# Patient Record
Sex: FEMALE | Race: WHITE | NOT HISPANIC OR LATINO
[De-identification: names, ages, dates, MRNs, and addresses within clinical notes are randomized per-mention and may not be internally consistent; named-entity substitution may affect disease eponyms.]

---

## 2022-08-31 PROBLEM — Z00.00 ENCOUNTER FOR PREVENTIVE HEALTH EXAMINATION: Status: ACTIVE | Noted: 2022-08-31

## 2022-09-22 ENCOUNTER — APPOINTMENT (OUTPATIENT)
Dept: OTOLARYNGOLOGY | Facility: CLINIC | Age: 57
End: 2022-09-22

## 2022-09-22 VITALS
WEIGHT: 137 LBS | BODY MASS INDEX: 25.21 KG/M2 | SYSTOLIC BLOOD PRESSURE: 105 MMHG | HEART RATE: 67 BPM | TEMPERATURE: 97.5 F | DIASTOLIC BLOOD PRESSURE: 68 MMHG | HEIGHT: 62 IN

## 2022-09-22 DIAGNOSIS — H81.10 BENIGN PAROXYSMAL VERTIGO, UNSPECIFIED EAR: ICD-10-CM

## 2022-09-22 DIAGNOSIS — F32.A DEPRESSION, UNSPECIFIED: ICD-10-CM

## 2022-09-22 DIAGNOSIS — R42 DIZZINESS AND GIDDINESS: ICD-10-CM

## 2022-09-22 DIAGNOSIS — E78.5 HYPERLIPIDEMIA, UNSPECIFIED: ICD-10-CM

## 2022-09-22 DIAGNOSIS — H90.3 SENSORINEURAL HEARING LOSS, BILATERAL: ICD-10-CM

## 2022-09-22 DIAGNOSIS — E03.9 HYPOTHYROIDISM, UNSPECIFIED: ICD-10-CM

## 2022-09-22 DIAGNOSIS — Z78.9 OTHER SPECIFIED HEALTH STATUS: ICD-10-CM

## 2022-09-22 PROCEDURE — 99204 OFFICE O/P NEW MOD 45 MIN: CPT | Mod: 25

## 2022-09-22 PROCEDURE — 92567 TYMPANOMETRY: CPT

## 2022-09-22 PROCEDURE — 92557 COMPREHENSIVE HEARING TEST: CPT

## 2022-09-22 PROCEDURE — G0268 REMOVAL OF IMPACTED WAX MD: CPT

## 2022-09-22 RX ORDER — SERTRALINE HYDROCHLORIDE 200 MG/1
200 CAPSULE ORAL
Refills: 0 | Status: ACTIVE | COMMUNITY

## 2022-09-22 RX ORDER — LEVOTHYROXINE SODIUM 150 UG/1
150 TABLET ORAL
Refills: 0 | Status: ACTIVE | COMMUNITY

## 2022-09-22 RX ORDER — BUPROPION HYDROCHLORIDE 150 MG/1
150 TABLET, FILM COATED ORAL
Refills: 0 | Status: ACTIVE | COMMUNITY

## 2022-09-22 NOTE — ASSESSMENT
[FreeTextEntry1] : Patient with c/o clogged ears and also hx of balance problems which started several weeks ago.  Balance better now.  Feel likely BPPV and in view of improvement recommended conservative care - given vestibular exercises to do as necessary.  Also has symmetric snhl - discussed HAE - patient not interested - recommended yearly audio

## 2022-09-22 NOTE — DATA REVIEWED
[de-identified] : Mild to moderate SNHL bilaterally\par Type A tymps\par REC HAE-pt not interested\par

## 2022-09-22 NOTE — HISTORY OF PRESENT ILLNESS
[de-identified] : c/o feeling of clogged ears.  Problem started 3 weeks ago - Also noted problems with balance when standing.  ? decreased hearing in right ear.  Noted when getting up with balance - did have problems turning.  Problem much better now.  No prior ear problems   Occ ear infections as child

## 2023-08-31 ENCOUNTER — EMERGENCY (EMERGENCY)
Facility: HOSPITAL | Age: 58
LOS: 0 days | Discharge: HOME | End: 2023-09-01
Attending: FAMILY MEDICINE
Payer: COMMERCIAL

## 2023-08-31 VITALS
SYSTOLIC BLOOD PRESSURE: 134 MMHG | HEIGHT: 62 IN | WEIGHT: 134.92 LBS | HEART RATE: 66 BPM | RESPIRATION RATE: 17 BRPM | DIASTOLIC BLOOD PRESSURE: 75 MMHG | TEMPERATURE: 98 F | OXYGEN SATURATION: 97 %

## 2023-08-31 DIAGNOSIS — S32.018A OTHER FRACTURE OF FIRST LUMBAR VERTEBRA, INITIAL ENCOUNTER FOR CLOSED FRACTURE: ICD-10-CM

## 2023-08-31 DIAGNOSIS — M25.522 PAIN IN LEFT ELBOW: ICD-10-CM

## 2023-08-31 DIAGNOSIS — M54.50 LOW BACK PAIN, UNSPECIFIED: ICD-10-CM
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
## 2023-08-31 LAB
ALBUMIN SERPL ELPH-MCNC: 3.9 G/DL — SIGNIFICANT CHANGE UP (ref 3.3–5)
ALP SERPL-CCNC: 87 U/L — SIGNIFICANT CHANGE UP (ref 40–120)
ALT FLD-CCNC: 31 U/L — SIGNIFICANT CHANGE UP (ref 12–78)
ANION GAP SERPL CALC-SCNC: 2 MMOL/L — LOW (ref 5–17)
APPEARANCE UR: CLEAR — SIGNIFICANT CHANGE UP
APTT BLD: 30.8 SEC — SIGNIFICANT CHANGE UP (ref 24.5–35.6)
AST SERPL-CCNC: 18 U/L — SIGNIFICANT CHANGE UP (ref 15–37)
BACTERIA # UR AUTO: ABNORMAL
BASOPHILS # BLD AUTO: 0.09 K/UL — SIGNIFICANT CHANGE UP (ref 0–0.2)
BASOPHILS NFR BLD AUTO: 1.4 % — SIGNIFICANT CHANGE UP (ref 0–2)
BILIRUB SERPL-MCNC: 0.2 MG/DL — SIGNIFICANT CHANGE UP (ref 0.2–1.2)
BILIRUB UR-MCNC: NEGATIVE — SIGNIFICANT CHANGE UP
BLD GP AB SCN SERPL QL: SIGNIFICANT CHANGE UP
BUN SERPL-MCNC: 15 MG/DL — SIGNIFICANT CHANGE UP (ref 7–23)
CALCIUM SERPL-MCNC: 9.4 MG/DL — SIGNIFICANT CHANGE UP (ref 8.5–10.1)
CHLORIDE SERPL-SCNC: 109 MMOL/L — HIGH (ref 96–108)
CO2 SERPL-SCNC: 29 MMOL/L — SIGNIFICANT CHANGE UP (ref 22–31)
COLOR SPEC: YELLOW — SIGNIFICANT CHANGE UP
CREAT SERPL-MCNC: 0.77 MG/DL — SIGNIFICANT CHANGE UP (ref 0.5–1.3)
DIFF PNL FLD: ABNORMAL
EGFR: 89 ML/MIN/1.73M2 — SIGNIFICANT CHANGE UP
EOSINOPHIL # BLD AUTO: 0.12 K/UL — SIGNIFICANT CHANGE UP (ref 0–0.5)
EOSINOPHIL NFR BLD AUTO: 1.8 % — SIGNIFICANT CHANGE UP (ref 0–6)
EPI CELLS # UR: SIGNIFICANT CHANGE UP
GLUCOSE SERPL-MCNC: 101 MG/DL — HIGH (ref 70–99)
GLUCOSE UR QL: NEGATIVE — SIGNIFICANT CHANGE UP
HCT VFR BLD CALC: 37.4 % — SIGNIFICANT CHANGE UP (ref 34.5–45)
HGB BLD-MCNC: 12.7 G/DL — SIGNIFICANT CHANGE UP (ref 11.5–15.5)
IMM GRANULOCYTES NFR BLD AUTO: 0.3 % — SIGNIFICANT CHANGE UP (ref 0–0.9)
INR BLD: 0.91 RATIO — SIGNIFICANT CHANGE UP (ref 0.85–1.18)
KETONES UR-MCNC: NEGATIVE — SIGNIFICANT CHANGE UP
LEUKOCYTE ESTERASE UR-ACNC: ABNORMAL
LYMPHOCYTES # BLD AUTO: 2.14 K/UL — SIGNIFICANT CHANGE UP (ref 1–3.3)
LYMPHOCYTES # BLD AUTO: 32.3 % — SIGNIFICANT CHANGE UP (ref 13–44)
MCHC RBC-ENTMCNC: 30.8 PG — SIGNIFICANT CHANGE UP (ref 27–34)
MCHC RBC-ENTMCNC: 34 GM/DL — SIGNIFICANT CHANGE UP (ref 32–36)
MCV RBC AUTO: 90.8 FL — SIGNIFICANT CHANGE UP (ref 80–100)
MONOCYTES # BLD AUTO: 0.77 K/UL — SIGNIFICANT CHANGE UP (ref 0–0.9)
MONOCYTES NFR BLD AUTO: 11.6 % — SIGNIFICANT CHANGE UP (ref 2–14)
NEUTROPHILS # BLD AUTO: 3.49 K/UL — SIGNIFICANT CHANGE UP (ref 1.8–7.4)
NEUTROPHILS NFR BLD AUTO: 52.6 % — SIGNIFICANT CHANGE UP (ref 43–77)
NITRITE UR-MCNC: NEGATIVE — SIGNIFICANT CHANGE UP
PH UR: 6.5 — SIGNIFICANT CHANGE UP (ref 5–8)
PLATELET # BLD AUTO: 329 K/UL — SIGNIFICANT CHANGE UP (ref 150–400)
POTASSIUM SERPL-MCNC: 3.9 MMOL/L — SIGNIFICANT CHANGE UP (ref 3.5–5.3)
POTASSIUM SERPL-SCNC: 3.9 MMOL/L — SIGNIFICANT CHANGE UP (ref 3.5–5.3)
PROT SERPL-MCNC: 7.6 GM/DL — SIGNIFICANT CHANGE UP (ref 6–8.3)
PROT UR-MCNC: NEGATIVE — SIGNIFICANT CHANGE UP
PROTHROM AB SERPL-ACNC: 10.3 SEC — SIGNIFICANT CHANGE UP (ref 9.5–13)
RBC # BLD: 4.12 M/UL — SIGNIFICANT CHANGE UP (ref 3.8–5.2)
RBC # FLD: 14.6 % — HIGH (ref 10.3–14.5)
RBC CASTS # UR COMP ASSIST: NEGATIVE /HPF — SIGNIFICANT CHANGE UP (ref 0–4)
SODIUM SERPL-SCNC: 140 MMOL/L — SIGNIFICANT CHANGE UP (ref 135–145)
SP GR SPEC: 1.01 — SIGNIFICANT CHANGE UP (ref 1.01–1.02)
UROBILINOGEN FLD QL: NEGATIVE — SIGNIFICANT CHANGE UP
WBC # BLD: 6.63 K/UL — SIGNIFICANT CHANGE UP (ref 3.8–10.5)
WBC # FLD AUTO: 6.63 K/UL — SIGNIFICANT CHANGE UP (ref 3.8–10.5)
WBC UR QL: ABNORMAL /HPF (ref 0–5)

## 2023-08-31 PROCEDURE — 96374 THER/PROPH/DIAG INJ IV PUSH: CPT | Mod: XU

## 2023-08-31 PROCEDURE — 99284 EMERGENCY DEPT VISIT MOD MDM: CPT | Mod: 25

## 2023-08-31 PROCEDURE — 96375 TX/PRO/DX INJ NEW DRUG ADDON: CPT | Mod: XU

## 2023-08-31 PROCEDURE — 85610 PROTHROMBIN TIME: CPT

## 2023-08-31 PROCEDURE — 99285 EMERGENCY DEPT VISIT HI MDM: CPT

## 2023-08-31 PROCEDURE — 81001 URINALYSIS AUTO W/SCOPE: CPT

## 2023-08-31 PROCEDURE — 86900 BLOOD TYPING SEROLOGIC ABO: CPT

## 2023-08-31 PROCEDURE — 80053 COMPREHEN METABOLIC PANEL: CPT

## 2023-08-31 PROCEDURE — 36415 COLL VENOUS BLD VENIPUNCTURE: CPT

## 2023-08-31 PROCEDURE — 71260 CT THORAX DX C+: CPT | Mod: MA

## 2023-08-31 PROCEDURE — 74177 CT ABD & PELVIS W/CONTRAST: CPT | Mod: MA

## 2023-08-31 PROCEDURE — 85025 COMPLETE CBC W/AUTO DIFF WBC: CPT

## 2023-08-31 PROCEDURE — 99283 EMERGENCY DEPT VISIT LOW MDM: CPT

## 2023-08-31 PROCEDURE — 86901 BLOOD TYPING SEROLOGIC RH(D): CPT

## 2023-08-31 PROCEDURE — 85730 THROMBOPLASTIN TIME PARTIAL: CPT

## 2023-08-31 PROCEDURE — 71260 CT THORAX DX C+: CPT | Mod: 26,MA

## 2023-08-31 PROCEDURE — 86850 RBC ANTIBODY SCREEN: CPT

## 2023-08-31 PROCEDURE — 74177 CT ABD & PELVIS W/CONTRAST: CPT | Mod: 26,MA

## 2023-08-31 RX ORDER — IBUPROFEN 200 MG
1 TABLET ORAL
Qty: 30 | Refills: 0
Start: 2023-08-31 | End: 2023-09-09

## 2023-08-31 RX ORDER — KETOROLAC TROMETHAMINE 30 MG/ML
15 SYRINGE (ML) INJECTION ONCE
Refills: 0 | Status: DISCONTINUED | OUTPATIENT
Start: 2023-08-31 | End: 2023-08-31

## 2023-08-31 RX ORDER — ACETAMINOPHEN 500 MG
1000 TABLET ORAL ONCE
Refills: 0 | Status: COMPLETED | OUTPATIENT
Start: 2023-08-31 | End: 2023-08-31

## 2023-08-31 RX ORDER — SODIUM CHLORIDE 9 MG/ML
1000 INJECTION INTRAMUSCULAR; INTRAVENOUS; SUBCUTANEOUS ONCE
Refills: 0 | Status: COMPLETED | OUTPATIENT
Start: 2023-08-31 | End: 2023-08-31

## 2023-08-31 RX ADMIN — Medication 400 MILLIGRAM(S): at 22:03

## 2023-08-31 RX ADMIN — SODIUM CHLORIDE 1000 MILLILITER(S): 9 INJECTION INTRAMUSCULAR; INTRAVENOUS; SUBCUTANEOUS at 22:03

## 2023-08-31 NOTE — ED ADULT TRIAGE NOTE - CHIEF COMPLAINT QUOTE
Pt BIBEMS s/p fall down 3 steps. Pt states she was walking down her basement stairs when she slipped and fell. Pt denies head strike, LOC, blood thinner use, dizziness, weakness, n/v, CP, SOB. Pt endorsing left lower back and left elbow pain. Pt denies any other complaints at this time.

## 2023-08-31 NOTE — CONSULT NOTE ADULT - SUBJECTIVE AND OBJECTIVE BOX
Patient is a 58y old  Female who presents with a chief complaint of back pain s/p fall    HPI:  59 yo female with no pertinent PMH presenting to the ED s/p fall. As per records, pt fell while walking down her basement stairs. She landed on Left lower back and slid down 3 wooden steps. Denies head strike, LOC. CT C/A/P sig for acute L1 transverse process fracture.     Chart / films reviewed        PAST MEDICAL & SURGICAL HISTORY:  Denies        FAMILY HISTORY:  Noncontributory         Social Hx:  Nonsmoker, no drug or alcohol use        Allergies  No Known Allergies        MEDICATIONS reviewed         Vital Signs Last 24 Hrs  T(C): 36.4 (31 Aug 2023 20:02), Max: 36.4 (31 Aug 2023 20:02)  T(F): 97.6 (31 Aug 2023 20:02), Max: 97.6 (31 Aug 2023 20:02)  HR: 66 (31 Aug 2023 20:02) (66 - 66)  BP: 134/75 (31 Aug 2023 20:02) (134/75 - 134/75)  RR: 17 (31 Aug 2023 20:02) (17 - 17)  SpO2: 97% (31 Aug 2023 20:02) (97% - 97%)    Parameters below as of 31 Aug 2023 20:02  Patient On (Oxygen Delivery Method): room air        Labs:                        12.7   6.63  )-----------( 329      ( 31 Aug 2023 21:56 )             37.4     140  |  109<H>  |  15  ----------------------------<  101<H>  3.9   |  29  |  0.77    Ca    9.4      31 Aug 2023 21:56    TPro  7.6  /  Alb  3.9  /  TBili  0.2  /  DBili  x   /  AST  18  /  ALT  31  /  AlkPhos  87  08-31    PT/INR - ( 31 Aug 2023 21:56 )   PT: 10.3 sec;   INR: 0.91 ratio       PTT - ( 31 Aug 2023 21:56 )  PTT:30.8 sec        Radiology report:  CT Abdomen and Pelvis w/ IV Cont (08.31.23 @ 22:34) >  CT CHEST: No evidence of acute traumatic injury.  No chest wall contusion  or rib fracture.    CT ABDOMEN/PELVIS: Acute, nondisplaced fracture in the left L1 transverse   process.       Patient is a 58y old  Female who presents with a chief complaint of back pain s/p fall    HPI:  57 yo female with no pertinent PMH presenting to the ED s/p fall. As per records, pt fell while walking down her basement stairs. She landed on Left lower back and slid down 3 wooden steps. Denies head strike, LOC. CT C/A/P sig for acute L1 transverse process fracture.     Chart / films reviewed        PAST MEDICAL & SURGICAL HISTORY:  Denies        FAMILY HISTORY:  Noncontributory         Social Hx:  Nonsmoker, no drug or alcohol use        Allergies  No Known Allergies        MEDICATIONS reviewed         Vital Signs Last 24 Hrs  T(C): 36.4 (31 Aug 2023 20:02), Max: 36.4 (31 Aug 2023 20:02)  T(F): 97.6 (31 Aug 2023 20:02), Max: 97.6 (31 Aug 2023 20:02)  HR: 66 (31 Aug 2023 20:02) (66 - 66)  BP: 134/75 (31 Aug 2023 20:02) (134/75 - 134/75)  RR: 17 (31 Aug 2023 20:02) (17 - 17)  SpO2: 97% (31 Aug 2023 20:02) (97% - 97%)    Parameters below as of 31 Aug 2023 20:02  Patient On (Oxygen Delivery Method): room air        Labs:                        12.7   6.63  )-----------( 329      ( 31 Aug 2023 21:56 )             37.4     140  |  109<H>  |  15  ----------------------------<  101<H>  3.9   |  29  |  0.77    Ca    9.4      31 Aug 2023 21:56    TPro  7.6  /  Alb  3.9  /  TBili  0.2  /  DBili  x   /  AST  18  /  ALT  31  /  AlkPhos  87  08-31    PT/INR - ( 31 Aug 2023 21:56 )   PT: 10.3 sec;   INR: 0.91 ratio       PTT - ( 31 Aug 2023 21:56 )  PTT:30.8 sec        Radiology report:  CT Abdomen and Pelvis w/ IV Cont (08.31.23 @ 22:34) >  CT CHEST: No evidence of acute traumatic injury.  No chest wall contusion  or rib fracture.    CT ABDOMEN/PELVIS: Acute, nondisplaced fracture in the left L1 transverse   process.

## 2023-08-31 NOTE — ED PROVIDER NOTE - OBJECTIVE STATEMENT
Patient is a 57 y/o female BIBEMS with no pertinent PMHx; presenting to the ED s/p fall, sliding down 3 wooden steps on her back as she was walking down to the basement, landing on left lower back which occurred 7 hours ago. Denies head strike, LOC, blood thinner use, dizziness, weakness. Patient is c/o left lower back and left elbow pain.

## 2023-08-31 NOTE — ED PROVIDER NOTE - PROGRESS NOTE DETAILS
Spoke to neurosurgery PA who recommends pain control and follow up in office. Dione BENJAMIN Discussed results with pt and friend in room. Pt states if she doesn't move it doesn't hurt but pain inc as she goes to br and moves. Has received ofirmev. Will give toradol and d/c with script for motrin . Dione BENJAMIN MYRNAG: Received signout from Dr. Parham to re-evaluate after toradol.  Pain now controlled.  Patient wants to go home.  Will refer to spine for transverse process fracture.  Patient ambulating independently without pain.

## 2023-08-31 NOTE — ED ADULT NURSE NOTE - NSFALLUNIVINTERV_ED_ALL_ED
Bed/Stretcher in lowest position, wheels locked, appropriate side rails in place/Call bell, personal items and telephone in reach/Instruct patient to call for assistance before getting out of bed/chair/stretcher/Non-slip footwear applied when patient is off stretcher/[place] to call system/Physically safe environment - no spills, clutter or unnecessary equipment/Purposeful proactive rounding/Room/bathroom lighting operational, light cord in reach

## 2023-08-31 NOTE — ED ADULT NURSE NOTE - OBJECTIVE STATEMENT
57 y/o F with c/o of lower back pain s/p fall down her basement stairs. Pt denies head strike, denies LOC, not on blood thinners, in NAD, evaluated by provider.

## 2023-08-31 NOTE — ED PROVIDER NOTE - CARE PROVIDER_API CALL
Jose Garg [facility]  Spine Surgery  [address], [address]  [place] [address]  Phone: [phone]  Fax: [phone]  Follow Up Time:

## 2023-08-31 NOTE — ED PROVIDER NOTE - NSFOLLOWUPINSTRUCTIONS_ED_ALL_ED_FT
Take Motrin 600mg every eight hours with food. Apply ice 15min on and off and rest. Follow up with Dr. Garg. Return to ER if worse.   Transverse Process Fracture  Back view of a person showing the lumbar spine and pelvis with a close-up of transverse process fractures.  Bones of the spine (vertebrae) have portions that extend off to either side of the spine. These portions of bone are called transverse processes. A transverse process fracture is a break in a transverse process.    What are the causes?  This condition may be caused by:  A fall from a great height.  A motor vehicle accident.  A sports injury.  A gunshot wound.  A hard, direct hit to the back.  This kind of fracture often results from a sudden and severe bending of the spine to one side. Depending on the cause of the fracture, one or more bones may be affected.    What increases the risk?  You are more likely to develop this condition if:  You have weak and thinning bones (osteoporosis).  You play a contact sport.  What are the signs or symptoms?  The main symptom of this condition is back pain. The pain may:  Be felt on the side of the spine (flank) where the fracture is.  Get worse when you move or take a deep breath.  How is this diagnosed?  This condition may be diagnosed based on:  Your symptoms.  Your medical history.  A physical exam.  You may also have tests, including:  X-rays.  CT scan.  MRI.  How is this treated?  Most transverse process fractures heal on their own with time and rest. Treatment may involve supportive care, such as:  Limiting activity.  Medicines, such as:  Pain medicine.  Medicine to relax the muscles.  Physical therapy exercises to improve movement and strength in the affected area.  A neck or back brace.  Follow these instructions at home:  If you have a removable brace:    Wear the neck or back brace as told by your health care provider. Remove it only as told by your health care provider.  Check the skin around the brace every day. Tell your health care provider about any concerns.  Keep the brace clean.  If the brace is not waterproof:  Do not let it get wet.  Cover it with a watertight covering when you take a bath or shower.  Managing pain, stiffness, and swelling    Bag of ice on a towel on the skin.  If told, put ice on the injured area.  If you have a removable brace, remove it as told by your health care provider.  Put ice in a plastic bag.  Place a towel between your skin and the bag.  Leave the ice on for 20 minutes, 2–3 times a day.  If your skin turns bright red, remove the ice right away to prevent skin damage. The risk of damage is higher if you cannot feel pain, heat, or cold.  Medicines    Take over-the-counter and prescription medicines only as told by your health care provider.  Ask your health care provider if the medicine prescribed to you:  Requires you to avoid driving or using machinery.  Can cause constipation. You may need to take these actions to prevent or treat constipation:  Drink enough fluid to keep your urine pale yellow.  Take over-the-counter or prescription medicines.  Eat foods that are high in fiber, such as beans, whole grains, and fresh fruits and vegetables.  Limit foods that are high in fat and processed sugars, such as fried or sweet foods.  Activity    Stay in bed (on bed rest) only as told by your health care provider.  Avoid being in bed for a long time without moving. Get up to take short walks every 1–2 hours. This will improve blood flow and breathing. Ask for help if you feel weak or unsteady.  Return to your normal activities when your health care provider says it is okay. Ask if there are any activities that you should not do.  Do physical therapy exercises as told by your health care provider.  General instructions    Do not use any products that contain nicotine or tobacco. These products include cigarettes, chewing tobacco, and vaping devices, such as e-cigarettes. These can delay bone healing. If you need help quitting, ask your health care provider.  Keep all follow-up visits. Your health care provider needs to check how you are healing. This can help prevent permanent injury and long-term (chronic) pain.  Contact a health care provider if:  You have a fever.  You develop a cough that makes your pain worse.  Your pain medicine is not helping.  Your pain does not get better over time.  You cannot return to your normal activities as planned or expected.  Get help right away if:  Your pain is very bad and it suddenly gets worse.  You are unable to move any body part (paralysis) that is below the level of your injury.  You have numbness, tingling, or weakness in any body part that is below the level of your injury.  You cannot control when you urinate or have bowel movements.  These symptoms may be an emergency. Get help right away. Call 911.  Do not wait to see if the symptoms will go away.  Do not drive yourself to the hospital.  This information is not intended to replace advice given to you by your health care provider. Make sure you discuss any questions you have with your health care provider.    Document Revised: 07/11/2023 Document Reviewed: 07/11/2023

## 2023-08-31 NOTE — ED PROVIDER NOTE - PATIENT PORTAL LINK FT
You can access the FollowMyHealth Patient Portal offered by [facility] by registering at the following website: [email or web address]. By joining [employer]’s FollowMyHealth portal, you will also be able to view your health information using other applications (apps) compatible with our system.

## 2023-08-31 NOTE — ED PROVIDER NOTE - CPE EDP [facility] NORM
normal... The patient was seen and evaluated independently by the attending physician.  - I have personally reviewed the pt's labs, imaging, micro data and consultant recommendations.    #atypical pna  - r/o Tb  - continue current regimen    #constipation  - add bottle of milk of magnesia   - monitor stool output

## 2023-08-31 NOTE — ED PROVIDER NOTE - CLINICAL SUMMARY MEDICAL DECISION MAKING FREE TEXT BOX
Patient is a 57 y/o female BIBEMS with no pertinent PMHx; presenting to the ED s/p fall, sliding down 3 wooden steps on her back as she was walking down to the basement, landing on left lower back which occurred 7 hours ago. Plan CT A/P, CT Chest to rule out spine, kidney and rib injuries, pain medications, labs.

## 2023-08-31 NOTE — CONSULT NOTE ADULT - ASSESSMENT
57 yo female with no pertinent PMH presenting to the ED s/p fall. As per records, pt fell while walking down her basement stairs. She landed on Left lower back and slid down 3 wooden steps. Denies head strike, LOC. CT C/A/P sig for acute L1 transverse process fracture.    - No acute neurosurgical intervention   - Fracture will heal on its own  - No brace required  - Pain meds PRN  - May follow up with Dr Garg as needed if pain does not improve by 4 weeks    Will d/w attending       59 yo female with no pertinent PMH presenting to the ED s/p fall. As per records, pt fell while walking down her basement stairs. She landed on Left lower back and slid down 3 wooden steps. Denies head strike, LOC. CT C/A/P sig for acute L1 transverse process fracture.    - No acute neurosurgical intervention   - Fracture will heal on its own  - No brace required  - Pain meds PRN  - May follow up with Dr Garg as needed if pain does not improve by 4 weeks    Will d/w attending

## 2023-09-01 VITALS
DIASTOLIC BLOOD PRESSURE: 71 MMHG | OXYGEN SATURATION: 98 % | TEMPERATURE: 97 F | RESPIRATION RATE: 17 BRPM | HEART RATE: 62 BPM | SYSTOLIC BLOOD PRESSURE: 111 MMHG

## 2023-09-01 RX ADMIN — Medication 15 MILLIGRAM(S): at 00:29

## 2023-09-12 PROBLEM — Z78.9 OTHER SPECIFIED HEALTH STATUS: Chronic | Status: ACTIVE | Noted: 2023-09-02

## 2023-09-21 ENCOUNTER — APPOINTMENT (OUTPATIENT)
Dept: PHYSICAL MEDICINE AND REHAB | Facility: CLINIC | Age: 58
End: 2023-09-21

## 2025-01-16 ENCOUNTER — APPOINTMENT (OUTPATIENT)
Dept: GASTROENTEROLOGY | Facility: CLINIC | Age: 60
End: 2025-01-16